# Patient Record
Sex: FEMALE | Race: WHITE | NOT HISPANIC OR LATINO | URBAN - METROPOLITAN AREA
[De-identification: names, ages, dates, MRNs, and addresses within clinical notes are randomized per-mention and may not be internally consistent; named-entity substitution may affect disease eponyms.]

---

## 2020-12-15 ENCOUNTER — APPOINTMENT (RX ONLY)
Dept: URBAN - METROPOLITAN AREA CLINIC 128 | Facility: CLINIC | Age: 75
Setting detail: DERMATOLOGY
End: 2020-12-15

## 2020-12-15 DIAGNOSIS — L82.1 OTHER SEBORRHEIC KERATOSIS: ICD-10-CM

## 2020-12-15 DIAGNOSIS — Z71.89 OTHER SPECIFIED COUNSELING: ICD-10-CM

## 2020-12-15 DIAGNOSIS — L81.4 OTHER MELANIN HYPERPIGMENTATION: ICD-10-CM

## 2020-12-15 DIAGNOSIS — D18.0 HEMANGIOMA: ICD-10-CM

## 2020-12-15 DIAGNOSIS — B35.1 TINEA UNGUIUM: ICD-10-CM

## 2020-12-15 DIAGNOSIS — L82.0 INFLAMED SEBORRHEIC KERATOSIS: ICD-10-CM

## 2020-12-15 DIAGNOSIS — D22 MELANOCYTIC NEVI: ICD-10-CM

## 2020-12-15 DIAGNOSIS — L29.89 OTHER PRURITUS: ICD-10-CM

## 2020-12-15 PROBLEM — L29.8 OTHER PRURITUS: Status: ACTIVE | Noted: 2020-12-15

## 2020-12-15 PROBLEM — D18.01 HEMANGIOMA OF SKIN AND SUBCUTANEOUS TISSUE: Status: ACTIVE | Noted: 2020-12-15

## 2020-12-15 PROBLEM — D22.5 MELANOCYTIC NEVI OF TRUNK: Status: ACTIVE | Noted: 2020-12-15

## 2020-12-15 PROCEDURE — ? LIQUID NITROGEN

## 2020-12-15 PROCEDURE — ? COUNSELING

## 2020-12-15 PROCEDURE — 99203 OFFICE O/P NEW LOW 30 MIN: CPT | Mod: 25

## 2020-12-15 PROCEDURE — 17110 DESTRUCTION B9 LES UP TO 14: CPT

## 2020-12-15 PROCEDURE — ? PRESCRIPTION

## 2020-12-15 PROCEDURE — ? TREATMENT REGIMEN

## 2020-12-15 RX ORDER — CICLOPIROX 80 MG/ML
SOLUTION TOPICAL BID
Qty: 1 | Refills: 5 | Status: ERX | COMMUNITY
Start: 2020-12-15

## 2020-12-15 RX ORDER — CLOBETASOL PROPIONATE 0.5 MG/ML
1 SOLUTION TOPICAL BID
Qty: 1 | Refills: 2 | Status: ERX | COMMUNITY
Start: 2020-12-15

## 2020-12-15 RX ADMIN — CICLOPIROX: 80 SOLUTION TOPICAL at 00:00

## 2020-12-15 RX ADMIN — CLOBETASOL PROPIONATE 1: 0.5 SOLUTION TOPICAL at 00:00

## 2020-12-15 ASSESSMENT — LOCATION SIMPLE DESCRIPTION DERM
LOCATION SIMPLE: NOSE
LOCATION SIMPLE: CHEST
LOCATION SIMPLE: RIGHT MIDDLE FINGER
LOCATION SIMPLE: RIGHT FOREHEAD
LOCATION SIMPLE: POSTERIOR SCALP
LOCATION SIMPLE: LOWER BACK
LOCATION SIMPLE: UPPER BACK
LOCATION SIMPLE: ABDOMEN
LOCATION SIMPLE: LEFT EYELID

## 2020-12-15 ASSESSMENT — LOCATION DETAILED DESCRIPTION DERM
LOCATION DETAILED: MID-OCCIPITAL SCALP
LOCATION DETAILED: NASAL ROOT
LOCATION DETAILED: LEFT LATERAL CANTHUS
LOCATION DETAILED: SUPERIOR LUMBAR SPINE
LOCATION DETAILED: PERIUMBILICAL SKIN
LOCATION DETAILED: SUPERIOR THORACIC SPINE
LOCATION DETAILED: SUBXIPHOID
LOCATION DETAILED: INFERIOR THORACIC SPINE
LOCATION DETAILED: RIGHT MIDDLE FINGERNAIL
LOCATION DETAILED: INFERIOR LUMBAR SPINE
LOCATION DETAILED: EPIGASTRIC SKIN
LOCATION DETAILED: MIDDLE STERNUM
LOCATION DETAILED: RIGHT INFERIOR LATERAL FOREHEAD

## 2020-12-15 ASSESSMENT — LOCATION ZONE DERM
LOCATION ZONE: SCALP
LOCATION ZONE: FACE
LOCATION ZONE: FINGER
LOCATION ZONE: EYELID
LOCATION ZONE: TRUNK
LOCATION ZONE: NOSE

## 2020-12-15 NOTE — HPI: SKIN LESION
How Severe Is Your Skin Lesion?: mild
Is This A New Presentation, Or A Follow-Up?: Kaylyn Willoughby

## 2020-12-15 NOTE — PROCEDURE: LIQUID NITROGEN
Render Post-Care Instructions In Note?: yes
Add 52 Modifier (Optional): no
Medical Necessity Clause: This procedure was medically necessary because the lesions that were treated were: irritated
Detail Level: Zone
Duration Of Freeze Thaw-Cycle (Seconds): 10
Number Of Freeze-Thaw Cycles: 2 freeze-thaw cycles
Medical Necessity Information: It is in your best interest to select a reason for this procedure from the list below. All of these items fulfill various CMS LCD requirements except the new and changing color options.
Post-Care Instructions: I reviewed with the patient in detail post-care instructions. Patient is to wear sunprotection, and avoid picking at any of the treated lesions. Pt may apply Vaseline to crusted or scabbing areas.
Consent: The patient's consent was obtained including but not limited to risks of crusting, scabbing, blistering, scarring, darker or lighter pigmentary change, recurrence, incomplete removal and infection.

## 2021-02-04 ENCOUNTER — OFFICE VISIT (OUTPATIENT)
Dept: URBAN - METROPOLITAN AREA CLINIC 60 | Facility: CLINIC | Age: 76
End: 2021-02-04

## 2022-07-09 ENCOUNTER — TELEPHONE ENCOUNTER (OUTPATIENT)
Dept: URBAN - METROPOLITAN AREA CLINIC 121 | Facility: CLINIC | Age: 77
End: 2022-07-09

## 2022-07-10 ENCOUNTER — TELEPHONE ENCOUNTER (OUTPATIENT)
Dept: URBAN - METROPOLITAN AREA CLINIC 121 | Facility: CLINIC | Age: 77
End: 2022-07-10

## 2022-12-01 ENCOUNTER — NEW PATIENT (OUTPATIENT)
Dept: URBAN - METROPOLITAN AREA CLINIC 105 | Facility: CLINIC | Age: 77
End: 2022-12-01

## 2022-12-01 DIAGNOSIS — H35.371: ICD-10-CM

## 2022-12-01 DIAGNOSIS — H35.3213: ICD-10-CM

## 2022-12-01 DIAGNOSIS — H35.3221: ICD-10-CM

## 2022-12-01 PROCEDURE — 92134 CPTRZ OPH DX IMG PST SGM RTA: CPT

## 2022-12-01 PROCEDURE — 67028 INJECTION EYE DRUG: CPT

## 2022-12-01 PROCEDURE — 92004 COMPRE OPH EXAM NEW PT 1/>: CPT

## 2022-12-01 PROCEDURE — J0178S EYLEA PFS SAMPLE

## 2022-12-01 ASSESSMENT — VISUAL ACUITY
OS_SC: 20/400
OD_SC: 20/70

## 2022-12-01 ASSESSMENT — TONOMETRY
OS_IOP_MMHG: 15
OD_IOP_MMHG: 17

## 2023-01-12 ENCOUNTER — FOLLOW UP (OUTPATIENT)
Dept: URBAN - METROPOLITAN AREA CLINIC 105 | Facility: CLINIC | Age: 78
End: 2023-01-12

## 2023-01-12 DIAGNOSIS — H35.3213: ICD-10-CM

## 2023-01-12 DIAGNOSIS — H35.3221: ICD-10-CM

## 2023-01-12 DIAGNOSIS — H35.371: ICD-10-CM

## 2023-01-12 PROCEDURE — 92012 INTRM OPH EXAM EST PATIENT: CPT | Mod: 25

## 2023-01-12 PROCEDURE — PFS EYLEA PFS

## 2023-01-12 PROCEDURE — 92134 CPTRZ OPH DX IMG PST SGM RTA: CPT

## 2023-01-12 PROCEDURE — 67028 INJECTION EYE DRUG: CPT | Mod: 50

## 2023-01-12 PROCEDURE — J0178S EYLEA PFS SAMPLE

## 2023-01-12 ASSESSMENT — VISUAL ACUITY
OS_SC: 20/200-2
OD_SC: 20/100+1

## 2023-01-12 ASSESSMENT — TONOMETRY
OS_IOP_MMHG: 12
OD_IOP_MMHG: 17

## 2023-03-09 ENCOUNTER — FOLLOW UP (OUTPATIENT)
Dept: URBAN - METROPOLITAN AREA CLINIC 105 | Facility: CLINIC | Age: 78
End: 2023-03-09

## 2023-03-09 DIAGNOSIS — H35.3231: ICD-10-CM

## 2023-03-09 DIAGNOSIS — H35.371: ICD-10-CM

## 2023-03-09 PROCEDURE — 67028 INJECTION EYE DRUG: CPT | Mod: 50

## 2023-03-09 PROCEDURE — 92014 COMPRE OPH EXAM EST PT 1/>: CPT | Mod: 25

## 2023-03-09 PROCEDURE — 92202 OPSCPY EXTND ON/MAC DRAW: CPT | Mod: NC

## 2023-03-09 PROCEDURE — PFS EYLEA PFS

## 2023-03-09 PROCEDURE — 92134 CPTRZ OPH DX IMG PST SGM RTA: CPT

## 2023-03-09 ASSESSMENT — TONOMETRY
OD_IOP_MMHG: 11
OS_IOP_MMHG: 10

## 2023-03-09 ASSESSMENT — VISUAL ACUITY
OS_SC: 20/800
OD_SC: 20/80+1

## 2023-05-18 ENCOUNTER — FOLLOW UP (OUTPATIENT)
Dept: URBAN - METROPOLITAN AREA CLINIC 105 | Facility: CLINIC | Age: 78
End: 2023-05-18

## 2023-05-18 DIAGNOSIS — H35.371: ICD-10-CM

## 2023-05-18 DIAGNOSIS — H35.3231: ICD-10-CM

## 2023-05-18 PROCEDURE — 92014 COMPRE OPH EXAM EST PT 1/>: CPT | Mod: 25

## 2023-05-18 PROCEDURE — 67028 INJECTION EYE DRUG: CPT | Mod: 50

## 2023-05-18 PROCEDURE — PFS EYLEA PFS

## 2023-05-18 PROCEDURE — 92202 OPSCPY EXTND ON/MAC DRAW: CPT

## 2023-05-18 PROCEDURE — 92134 CPTRZ OPH DX IMG PST SGM RTA: CPT

## 2023-05-18 ASSESSMENT — TONOMETRY
OS_IOP_MMHG: 13
OD_IOP_MMHG: 13

## 2023-05-18 ASSESSMENT — VISUAL ACUITY
OS_SC: 20/150-2
OD_SC: 20/100-1

## 2023-08-24 ENCOUNTER — FOLLOW UP (OUTPATIENT)
Dept: URBAN - METROPOLITAN AREA CLINIC 105 | Facility: CLINIC | Age: 78
End: 2023-08-24

## 2023-08-24 DIAGNOSIS — H35.371: ICD-10-CM

## 2023-08-24 DIAGNOSIS — H35.3231: ICD-10-CM

## 2023-08-24 PROCEDURE — 92134 CPTRZ OPH DX IMG PST SGM RTA: CPT

## 2023-08-24 PROCEDURE — 67028 INJECTION EYE DRUG: CPT | Mod: 50

## 2023-08-24 PROCEDURE — 92202 OPSCPY EXTND ON/MAC DRAW: CPT | Mod: NC

## 2023-08-24 PROCEDURE — PFS EYLEA PFS: Mod: JZ

## 2023-08-24 PROCEDURE — 92014 COMPRE OPH EXAM EST PT 1/>: CPT | Mod: 25

## 2023-08-24 ASSESSMENT — TONOMETRY
OD_IOP_MMHG: 22
OS_IOP_MMHG: 16

## 2023-08-24 ASSESSMENT — VISUAL ACUITY
OS_SC: 3/200
OD_SC: 20/100-2

## 2023-11-30 ENCOUNTER — FOLLOW UP (OUTPATIENT)
Dept: URBAN - METROPOLITAN AREA CLINIC 105 | Facility: CLINIC | Age: 78
End: 2023-11-30

## 2023-11-30 DIAGNOSIS — H35.3231: ICD-10-CM

## 2023-11-30 DIAGNOSIS — H35.371: ICD-10-CM

## 2023-11-30 PROCEDURE — PFS EYLEA PFS: Mod: JZ

## 2023-11-30 PROCEDURE — 92012 INTRM OPH EXAM EST PATIENT: CPT | Mod: 25

## 2023-11-30 PROCEDURE — 92134 CPTRZ OPH DX IMG PST SGM RTA: CPT

## 2023-11-30 PROCEDURE — 92202 OPSCPY EXTND ON/MAC DRAW: CPT

## 2023-11-30 PROCEDURE — 67028 INJECTION EYE DRUG: CPT | Mod: 50

## 2023-11-30 ASSESSMENT — VISUAL ACUITY
OD_SC: 20/200
OS_SC: 20/400+1

## 2023-11-30 ASSESSMENT — TONOMETRY
OD_IOP_MMHG: 22
OS_IOP_MMHG: 21

## 2024-04-04 ENCOUNTER — FOLLOW UP (OUTPATIENT)
Dept: URBAN - METROPOLITAN AREA CLINIC 105 | Facility: CLINIC | Age: 79
End: 2024-04-04

## 2024-04-04 DIAGNOSIS — H35.371: ICD-10-CM

## 2024-04-04 DIAGNOSIS — H35.3231: ICD-10-CM

## 2024-04-04 PROCEDURE — 92014 COMPRE OPH EXAM EST PT 1/>: CPT | Mod: 25

## 2024-04-04 PROCEDURE — 92202 OPSCPY EXTND ON/MAC DRAW: CPT | Mod: NC

## 2024-04-04 PROCEDURE — PFS EYLEA PFS: Mod: JZ

## 2024-04-04 PROCEDURE — 67028 INJECTION EYE DRUG: CPT | Mod: 50

## 2024-04-04 PROCEDURE — 92134 CPTRZ OPH DX IMG PST SGM RTA: CPT

## 2024-04-04 ASSESSMENT — TONOMETRY
OS_IOP_MMHG: 15
OD_IOP_MMHG: 17

## 2024-04-04 ASSESSMENT — VISUAL ACUITY
OS_SC: 20/150+2
OD_SC: 20/150+2

## 2024-08-15 ENCOUNTER — FOLLOW UP (OUTPATIENT)
Dept: URBAN - METROPOLITAN AREA CLINIC 105 | Facility: CLINIC | Age: 79
End: 2024-08-15

## 2024-08-15 DIAGNOSIS — H35.3231: ICD-10-CM

## 2024-08-15 DIAGNOSIS — H35.371: ICD-10-CM

## 2024-08-15 PROCEDURE — 92134 CPTRZ OPH DX IMG PST SGM RTA: CPT

## 2024-08-15 PROCEDURE — 67028 INJECTION EYE DRUG: CPT

## 2024-08-15 PROCEDURE — 92014 COMPRE OPH EXAM EST PT 1/>: CPT | Mod: 25

## 2024-08-15 PROCEDURE — PFS EYLEA PFS: Mod: JZ

## 2024-08-15 PROCEDURE — 92202 OPSCPY EXTND ON/MAC DRAW: CPT | Mod: 59

## 2024-08-15 ASSESSMENT — TONOMETRY
OD_IOP_MMHG: 17
OS_IOP_MMHG: 14

## 2024-08-15 ASSESSMENT — VISUAL ACUITY
OD_SC: 20/200
OS_SC: 20/400